# Patient Record
Sex: FEMALE | Race: AMERICAN INDIAN OR ALASKA NATIVE
[De-identification: names, ages, dates, MRNs, and addresses within clinical notes are randomized per-mention and may not be internally consistent; named-entity substitution may affect disease eponyms.]

---

## 2018-02-25 ENCOUNTER — HOSPITAL ENCOUNTER (EMERGENCY)
Dept: HOSPITAL 5 - ED | Age: 33
Discharge: HOME | End: 2018-02-25
Payer: COMMERCIAL

## 2018-02-25 VITALS — SYSTOLIC BLOOD PRESSURE: 122 MMHG | DIASTOLIC BLOOD PRESSURE: 74 MMHG

## 2018-02-25 DIAGNOSIS — R07.9: Primary | ICD-10-CM

## 2018-02-25 DIAGNOSIS — F17.200: ICD-10-CM

## 2018-02-25 DIAGNOSIS — R20.2: ICD-10-CM

## 2018-02-25 DIAGNOSIS — R42: ICD-10-CM

## 2018-02-25 LAB
BASOPHILS # (AUTO): 0 K/MM3 (ref 0–0.1)
BASOPHILS NFR BLD AUTO: 0.6 % (ref 0–1.8)
BILIRUB UR QL STRIP: (no result)
BLOOD UR QL VISUAL: (no result)
BUN SERPL-MCNC: 9 MG/DL (ref 7–17)
BUN/CREAT SERPL: 11 %
CALCIUM SERPL-MCNC: 8.7 MG/DL (ref 8.4–10.2)
EOSINOPHIL # BLD AUTO: 0.3 K/MM3 (ref 0–0.4)
EOSINOPHIL NFR BLD AUTO: 3.8 % (ref 0–4.3)
HCT VFR BLD CALC: 38.8 % (ref 30.3–42.9)
HEMOLYSIS INDEX: 2
HGB BLD-MCNC: 12.8 GM/DL (ref 10.1–14.3)
LYMPHOCYTES # BLD AUTO: 2.1 K/MM3 (ref 1.2–5.4)
LYMPHOCYTES NFR BLD AUTO: 28.3 % (ref 13.4–35)
MCH RBC QN AUTO: 28 PG (ref 28–32)
MCHC RBC AUTO-ENTMCNC: 33 % (ref 30–34)
MCV RBC AUTO: 86 FL (ref 79–97)
MONOCYTES # (AUTO): 0.6 K/MM3 (ref 0–0.8)
MONOCYTES % (AUTO): 8.5 % (ref 0–7.3)
MUCOUS THREADS #/AREA URNS HPF: (no result) /HPF
PH UR STRIP: 6 [PH] (ref 5–7)
PLATELET # BLD: 227 K/MM3 (ref 140–440)
PROT UR STRIP-MCNC: (no result) MG/DL
RBC # BLD AUTO: 4.5 M/MM3 (ref 3.65–5.03)
RBC #/AREA URNS HPF: 1 /HPF (ref 0–6)
UROBILINOGEN UR-MCNC: < 2 MG/DL (ref ?–2)
WBC #/AREA URNS HPF: 2 /HPF (ref 0–6)

## 2018-02-25 PROCEDURE — 99285 EMERGENCY DEPT VISIT HI MDM: CPT

## 2018-02-25 PROCEDURE — 80048 BASIC METABOLIC PNL TOTAL CA: CPT

## 2018-02-25 PROCEDURE — 93010 ELECTROCARDIOGRAM REPORT: CPT

## 2018-02-25 PROCEDURE — 85025 COMPLETE CBC W/AUTO DIFF WBC: CPT

## 2018-02-25 PROCEDURE — 36415 COLL VENOUS BLD VENIPUNCTURE: CPT

## 2018-02-25 PROCEDURE — 71045 X-RAY EXAM CHEST 1 VIEW: CPT

## 2018-02-25 PROCEDURE — 84703 CHORIONIC GONADOTROPIN ASSAY: CPT

## 2018-02-25 PROCEDURE — 84443 ASSAY THYROID STIM HORMONE: CPT

## 2018-02-25 PROCEDURE — 84484 ASSAY OF TROPONIN QUANT: CPT

## 2018-02-25 PROCEDURE — 93005 ELECTROCARDIOGRAM TRACING: CPT

## 2018-02-25 PROCEDURE — 85379 FIBRIN DEGRADATION QUANT: CPT

## 2018-02-25 PROCEDURE — 81001 URINALYSIS AUTO W/SCOPE: CPT

## 2018-02-25 PROCEDURE — 70450 CT HEAD/BRAIN W/O DYE: CPT

## 2018-02-25 PROCEDURE — 96360 HYDRATION IV INFUSION INIT: CPT

## 2018-02-25 NOTE — XRAY REPORT
Single view chest:



History: Chest pain.



Findings:



Normal cardiomediastinal silhouette. Trachea is midline. No 

consolidation, pneumothorax or pleural effusion.



Impression:



No acute cardiopulmonary findings.

## 2018-02-25 NOTE — EMERGENCY DEPARTMENT REPORT
ED Chest Pain HPI





- General


Chief Complaint: Chest Pain


Stated Complaint: CHEST PAIN,DIZZINESS


Time Seen by Provider: 02/25/18 08:53


Source: patient


Mode of arrival: Ambulatory


Limitations: No Limitations





- History of Present Illness


Initial Comments: 


32-year-old  female presents to the emergency department, 

dropped off to be seen, with multiple different complaints.  The patient says 

that she's been dealing with some dizziness and/or lightheadedness over the 

past few months intermittently.  She says that sometimes it feels like she is 

going to pass out but she has never done so.  She also complains of some blurry 

vision in both eyes that has been going on intermittently since last week.  She 

denies any headache or slurred speech.  She denies any numbness but does 

complain of some paresthesias in the fingers and in the feet.  She complains of 

some intermittent chest pain that affects different part of the chest at 

different times.  Sometimes it'll be below the right breast and she is also 

felt it towards the left shoulder.  She denies any shortness of breath, fever, 

back pain.  Sometimes she will feel nauseated with the dizziness but has not 

had any vomiting.  She has not taken anything for her symptoms prior to 

presentation.  She denies any past medical history.  She denies having a 

primary care physician.  She is a tobacco smoker and sometimes will smoke 

marijuana.








- Related Data


 Home Medications











 Medication  Instructions  Recorded  Confirmed  Last Taken


 


No Known Home Medications [No  03/07/14 03/07/14 Unknown





Reported Home Medications]    











 Allergies











Allergy/AdvReac Type Severity Reaction Status Date / Time


 


ibuprofen Allergy  Itching Verified 02/25/18 07:10














Heart Score





- HEART Score


History: Slightly suspicious


EKG: Normal


Age: < 45


Risk factors: 1-2 risk factors


Troponin: < normal limit


HEART Score: 1





- Critical Actions


Critical Actions: 0-3 pts:0.9-1.7%risk of adverse cardiac event.Candidate for 

discharge





ED Review of Systems


ROS: 


Stated complaint: CHEST PAIN,DIZZINESS


Other details as noted in HPI





Comment: All other systems reviewed and negative


Constitutional: denies: chills, fever


Eyes: vision change (blurry vision).  denies: eye pain, eye discharge


ENT: denies: ear pain, throat pain


Respiratory: denies: cough, shortness of breath, wheezing


Cardiovascular: chest pain.  denies: palpitations


Gastrointestinal: nausea.  denies: vomiting


Genitourinary: denies: urgency, dysuria, discharge


Musculoskeletal: denies: back pain, joint swelling


Skin: denies: rash, lesions


Neurological: paresthesias, other (dizziness)





ED Past Medical Hx





- Past Medical History


Previous Medical History?: No





- Social History


Smoking Status: Current Some Day Smoker





- Medications


Home Medications: 


 Home Medications











 Medication  Instructions  Recorded  Confirmed  Last Taken  Type


 


No Known Home Medications [No  03/07/14 03/07/14 Unknown History





Reported Home Medications]     














ED Physical Exam





- General


Limitations: No Limitations





- Other


Other exam information: 


GENERAL: The patient is well-developed well-nourished.


HENT: Normocephalic.  Atraumatic.    Patient has moist mucous membranes.


EYES: Extraocular motions are intact.  Pupils equal reactive to light 

bilaterally.


NECK: Supple.  Trachea is midline.


CHEST/LUNGS: Clear to auscultation.  There is no respiratory distress noted.


HEART/CARDIOVASCULAR: Regular.  There is no tachycardia.  There is no murmur.


ABDOMEN: Abdomen is soft, nontender.  Patient has normal bowel sounds.  There 

is no abdominal distention.


SKIN: Skin is warm and dry.


NEURO: The patient is awake, alert, and oriented.  The patient is cooperative.  

The patient has no focal neurologic deficits.  The patient has normal speech.  

Cranial nerves II-12 grossly intact.  No pronator drift.  No dysmetria.  No 

facial asymmetry.


MUSCULOSKELETAL: There is no tenderness or deformity.  There is no limitation 

range of motion.  There is no evidence of acute injury.  Muscle strength 5 out 

of 5 upper and lower extremities bilaterally.








ED Course


 Vital Signs











  02/25/18 02/25/18 02/25/18





  05:57 07:00 09:00


 


Temperature 98.2 F 98.2 F 


 


Pulse Rate 71 68 


 


Respiratory 18 18 





Rate   


 


Blood Pressure 114/66 114/66 122/77


 


O2 Sat by Pulse 97 98 100





Oximetry   














  02/25/18 02/25/18 02/25/18





  09:30 09:36 09:46


 


Temperature   


 


Pulse Rate 53 L  62


 


Respiratory 9 L 18 15





Rate   


 


Blood Pressure 125/74  125/74


 


O2 Sat by Pulse 100 99 100





Oximetry   














  02/25/18 02/25/18 02/25/18





  10:00 10:16 10:30


 


Temperature   


 


Pulse Rate 54 L 54 L 56 L


 


Respiratory 21 11 L 13





Rate   


 


Blood Pressure 116/69 116/69 128/81


 


O2 Sat by Pulse 100 100 100





Oximetry   














  02/25/18 02/25/18 02/25/18





  10:46 11:00 11:16


 


Temperature   


 


Pulse Rate 54 L 51 L 62


 


Respiratory 12 15 12





Rate   


 


Blood Pressure 118/72 125/64 125/64


 


O2 Sat by Pulse 100 100 100





Oximetry   














  02/25/18





  11:30


 


Temperature 


 


Pulse Rate 62


 


Respiratory 10 L





Rate 


 


Blood Pressure 122/74


 


O2 Sat by Pulse 100





Oximetry 














GRADY score





- Grady Score


Age > 65: (0) No


Aspirin use within the Past 7 Days: (0) No


3 or more CAD Risk Factors: (0) No


2 or more Angina events in past 24 hrs: (1) Yes


Known CAD with more than 50% Stenosis: (0) No


Elevated Cardiac Markers: (0) No


ST Deviation Greater than 0.5mm: (0) No


GRADY Score: 1





ED Medical Decision Making





- Lab Data


Result diagrams: 


 02/25/18 07:13





 02/25/18 07:13





- EKG Data


-: EKG Interpreted by Me


EKG shows normal: sinus rhythm, axis, intervals (prolonged AZ interval), QRS 

complexes, ST-T waves


Rate: normal





- EKG Data


When compared to previous EKG there are: previous EKG unavailable


Interpretation: other (sinus rhythm, normal rate, normal axis, prolonged AZ 

interval)





- Radiology Data


Radiology results: report reviewed, image reviewed


interpreted by me: 





Chest x-ray does not show any acute process.  There are no pleural effusions, 

obvious pneumonia and there is no pneumothorax.





CT scan of head without IV contrast: 





History: Dizziness. 





Findings: 





Ventricles are normal in size and midline in location. No evidence of 


acute ischemia, hemorrhage or mass. No extra axial fluid collection. 


Normal brainstem and cerebellum. 





Normal sinuses and mastoid air cells. 





Impression: 





No acute intracranial abnormality. 





Transcribed By: PTP 


Dictated By: AURDA KAUFMAN MD 


Electronically Authenticated By: AUDRA KAUFMAN MD 


Signed Date/Time: 02/25/18 1044 











- Medical Decision Making


Patient presents with multiple different complaints including intermittent 

chest pains, acute on chronic dizziness, blurry vision.  On physical exam, the 

patient has normal sounding heart and lungs to auscultation.  She would be an 

NIH stroke scale of 0.  There are no obvious focal, motor or sensory deficits 

in her cranial nerves are intact.  EKG did not show any ST elevation MI, 

ischemia or dysrhythmia.  There was a prolonged AZ interval but the patient did 

not show any bradycardia during her ED course.  The patient did show some signs 

of orthostatic hypotension going between supine and sitting.  CT of the head 

did not show any bleed, shift, mass or any acute process.  Chest x-ray also did 

not show any acute process.  The rest of the labs have been unremarkable as 

well including negative troponins 2 and a negative d-dimer.





Therefore, regarding her chest pains, the patient is low on the Sheets score 

criteria.  She would have a GRADY score of one of her pain is considered angina 

and 0 if not.  She had a negative d-dimer.  Chest x-ray did not show any acute 

process.  The only risk factor the patient would have his intermittent black 

and mild tobacco use.  Therefore she has low suspicion for any acute coronary 

syndrome as the cause of her intermittent chest pains.  However she will be 

given a referral for cardiology if she would like to follow up.





Regarding her dizziness and/or blurred vision, the patient had a CT of the head 

that did not show any acute process.  She does not show any focal, motor or 

sensory deficits.  Patient got IV fluid to treat some possible orthostatic 

hypotension and upon completion of this liter she says she is feeling better in 

regards to her dizziness.  She was seen ambulatory in the emergency department 

and does not appear unstable.  There is no signs of any ataxia.





Overall the patient still may need to follow up with a primary care physician, 

cardiologist and was also given a referral for neurology.  However I have not 

found any reason why the patient requires admission at this time.  She was 

given those appropriate follow-ups and/or specialist that also understands to 

return to the emergency Department with any worsening of her symptoms or any 

acute distress.














- Differential Diagnosis


orthostatic hypotension, vasovagal, vertigo, dysrhythmia, MI


Critical Care Time: No


Critical care attestation.: 


If time is entered above; I have spent that time in minutes in the direct care 

of this critically ill patient, excluding procedure time.








ED Disposition


Clinical Impression: 


 Intermittent chest pain, Dizziness, Paresthesia





Disposition: DC-01 TO HOME OR SELFCARE


Is pt being admited?: No


Condition: Stable


Instructions:  Chest Pain (ED), Paresthesia (ED), Lightheadedness (ED), 

Dizziness (ED)


Additional Instructions: 


Please follow up with a primary care physician in the next few days.  I have 

given you a referral for a local cardiologist, Dr. Ontiveros, to follow up 

regarding your intermittent chest pains.  I have also given you a referral for 

a local neurologist, Dr. Joe, to follow up regarding your dizziness and/or 

lightheadedness.  Return to the emergency Department with any worsening of your 

symptoms or any acute distress.


Referrals: 


SANDY ONTIVEROS MD [Staff Physician] - 3-5 Days


MURIEL JOE MD [Staff Physician] - 3-5 Days


VEE MI JR, MD [Staff Physician] - 3-5 Days


FLORI BILLY MD [Staff Physician] - 3-5 Days


Forms:  Work/School Release Form(ED)


Time of Disposition: 11:31

## 2018-02-25 NOTE — CAT SCAN REPORT
CT scan of head without IV contrast:



History: Dizziness.



Findings:



Ventricles are normal in size and midline in location. No evidence of 

acute ischemia, hemorrhage or mass. No extra axial fluid collection. 

Normal brainstem and cerebellum.



Normal sinuses and mastoid air cells.



Impression:



No acute intracranial abnormality.